# Patient Record
Sex: MALE | Race: WHITE | NOT HISPANIC OR LATINO | Employment: FULL TIME | URBAN - METROPOLITAN AREA
[De-identification: names, ages, dates, MRNs, and addresses within clinical notes are randomized per-mention and may not be internally consistent; named-entity substitution may affect disease eponyms.]

---

## 2024-09-19 ENCOUNTER — HOSPITAL ENCOUNTER (EMERGENCY)
Facility: HOSPITAL | Age: 59
Discharge: HOME | End: 2024-09-19
Attending: EMERGENCY MEDICINE | Admitting: EMERGENCY MEDICINE
Payer: OTHER MISCELLANEOUS

## 2024-09-19 ENCOUNTER — APPOINTMENT (OUTPATIENT)
Dept: RADIOLOGY | Facility: HOSPITAL | Age: 59
End: 2024-09-19
Attending: EMERGENCY MEDICINE
Payer: OTHER MISCELLANEOUS

## 2024-09-19 VITALS
HEART RATE: 62 BPM | OXYGEN SATURATION: 96 % | SYSTOLIC BLOOD PRESSURE: 141 MMHG | TEMPERATURE: 98.3 F | HEIGHT: 71 IN | WEIGHT: 245 LBS | BODY MASS INDEX: 34.3 KG/M2 | DIASTOLIC BLOOD PRESSURE: 89 MMHG | RESPIRATION RATE: 12 BRPM

## 2024-09-19 DIAGNOSIS — S61.211A LACERATION OF LEFT INDEX FINGER WITHOUT DAMAGE TO NAIL, FOREIGN BODY PRESENCE UNSPECIFIED, INITIAL ENCOUNTER: ICD-10-CM

## 2024-09-19 PROBLEM — F17.220 CHEWING TOBACCO DEPENDENCE: Status: ACTIVE | Noted: 2018-08-27

## 2024-09-19 PROBLEM — H34.219 CHOLESTEROL RETINAL EMBOLUS: Status: ACTIVE | Noted: 2024-08-09

## 2024-09-19 PROBLEM — G47.33 OBSTRUCTIVE SLEEP APNEA SYNDROME: Status: ACTIVE | Noted: 2018-08-27

## 2024-09-19 PROCEDURE — 90715 TDAP VACCINE 7 YRS/> IM: CPT | Performed by: EMERGENCY MEDICINE

## 2024-09-19 PROCEDURE — 272N000004 HC RX 272: Performed by: EMERGENCY MEDICINE

## 2024-09-19 PROCEDURE — 90471 IMMUNIZATION ADMIN: CPT | Performed by: EMERGENCY MEDICINE

## 2024-09-19 PROCEDURE — 12001 RPR S/N/AX/GEN/TRNK 2.5CM/<: CPT

## 2024-09-19 PROCEDURE — 250N000011 HC RX IP 250 OP 636: Performed by: EMERGENCY MEDICINE

## 2024-09-19 PROCEDURE — 99284 EMERGENCY DEPT VISIT MOD MDM: CPT | Mod: 25

## 2024-09-19 PROCEDURE — 73140 X-RAY EXAM OF FINGER(S): CPT | Mod: LT

## 2024-09-19 RX ORDER — CEPHALEXIN 500 MG/1
500 CAPSULE ORAL 4 TIMES DAILY
Qty: 28 CAPSULE | Refills: 0 | Status: SHIPPED | OUTPATIENT
Start: 2024-09-19 | End: 2024-09-26

## 2024-09-19 RX ADMIN — Medication 1 EACH: at 10:07

## 2024-09-19 RX ADMIN — CLOSTRIDIUM TETANI TOXOID ANTIGEN (FORMALDEHYDE INACTIVATED), CORYNEBACTERIUM DIPHTHERIAE TOXOID ANTIGEN (FORMALDEHYDE INACTIVATED), BORDETELLA PERTUSSIS TOXOID ANTIGEN (GLUTARALDEHYDE INACTIVATED), BORDETELLA PERTUSSIS FILAMENTOUS HEMAGGLUTININ ANTIGEN (FORMALDEHYDE INACTIVATED), BORDETELLA PERTUSSIS PERTACTIN ANTIGEN, AND BORDETELLA PERTUSSIS FIMBRIAE 2/3 ANTIGEN 0.5 ML: 5; 2; 2.5; 5; 3; 5 INJECTION, SUSPENSION INTRAMUSCULAR at 10:08

## 2024-09-19 ASSESSMENT — COLUMBIA-SUICIDE SEVERITY RATING SCALE - C-SSRS
6. HAVE YOU EVER DONE ANYTHING, STARTED TO DO ANYTHING, OR PREPARED TO DO ANYTHING TO END YOUR LIFE?: NO
1. IN THE PAST MONTH, HAVE YOU WISHED YOU WERE DEAD OR WISHED YOU COULD GO TO SLEEP AND NOT WAKE UP?: NO
2. HAVE YOU ACTUALLY HAD ANY THOUGHTS OF KILLING YOURSELF IN THE PAST MONTH?: NO

## 2024-09-19 ASSESSMENT — ACTIVITIES OF DAILY LIVING (ADL)
ADLS_ACUITY_SCORE: 35

## 2024-09-19 NOTE — ED PROVIDER NOTES
EMERGENCY DEPARTMENT ENCOUNTER      NAME: Mega Matthews  AGE: 58 year old male  YOB: 1965  MRN: [id number]  EVALUATION DATE & TIME: 2024  7:21 AM    PCP: No primary care provider on file.    ED PROVIDER: Tr Edwards D.O.      Chief Complaint   Patient presents with    Laceration       FINAL IMPRESSION:  1. Laceration of left index finger without damage to nail, foreign body presence unspecified, initial encounter        ED COURSE & MEDICAL DECISION MAKIN:35 AM I met with the patient to gather history and to perform my initial exam. I discussed the plan for care while in the Emergency Department.  10:09 AM I spoke on the phone with Dr. Latif, orthopedics, who recommends discharging the patient with Augmentin and Keflex, and advising for follow-up either with our hand surgeon or a hand surgeon closer to the patient's home.  10:20 AM I updated the patient and discussed the plan for discharge, patient is agreeable at this time.         Pertinent Labs & Imaging studies reviewed. (See chart for details)  58 year old male presents to the Emergency Department for evaluation of laceration to the pad of the index finger of the left hand after an injury at work.  The wound was quite dirty, tetanus was updated in the emergency department.  X-ray did not show any evidence of fracture, but there was some very tiny foreign bodies, however we could not locate these on the exam.  We did thoroughly irrigate the wound, and was able to somewhat close the wound with 8 simple interrupted sutures.  However some the tissue was somewhat friable therefore we are unable to fully close it.  I consulted with hand surgery, and they were okay with the wound remaining as it is, and recommended antibiotics at discharge and follow-up in clinic.  Patient was comfortable with this plan.  Return precautions were discussed.    Medical Decision Making  Obtained supplemental history:Supplemental history obtained?:  Documented in chart and Friend  Reviewed external records: External records reviewed?: Documented in chart  Care impacted by chronic illness:Smoking / Nicotine Use  Care significantly affected by social determinants of health:N/A  Did you consider but not order tests?: Work up considered but not performed and documented in chart, if applicable  Did you interpret images independently?: Independent interpretation of ECG and images noted in documentation, when applicable.  Consultation discussion with other provider:Did you involve another provider (consultant, , pharmacy, etc.)?: No  Discharge. I prescribed additional prescription strength medication(s) as charted. See documentation for any additional details.  Not Applicable      At the conclusion of the encounter I discussed the results of all of the tests and the disposition. The questions were answered. The patient or family acknowledged understanding and was agreeable with the care plan.          HPI    Patient information was obtained from: Patient and friend    Use of : N/A        Mega Matthews is a 58 year old male who presents for evaluation of finger laceration.    The patient reports that last night around 8:30 PM, his wrench slipped while he was bolting a water main at work, causing him to cut his left pointer finger. He wrapped it before bed and after removing the bandage this morning, decided to seek evaluation as his wound was worse than he initially thought. Patient believes his tetanus is up-to-date, and notes he was just seen by his PCP 2 weeks ago.    Per Chart Review: Patient's last Tdap was uncertain        PAST MEDICAL HISTORY:  No past medical history on file.    PAST SURGICAL HISTORY:  No past surgical history on file.      CURRENT MEDICATIONS:    No current facility-administered medications for this encounter.     Current Outpatient Medications   Medication Sig Dispense Refill    amoxicillin-clavulanate (AUGMENTIN) 875-125 MG  "tablet Take 1 tablet by mouth 2 times daily for 7 days. 14 tablet 0    cephALEXin (KEFLEX) 500 MG capsule Take 1 capsule (500 mg) by mouth 4 times daily for 7 days. 28 capsule 0         ALLERGIES:  No Known Allergies    FAMILY HISTORY:  No family history on file.    SOCIAL HISTORY:       VITALS:  Patient Vitals for the past 24 hrs:   BP Temp Temp src Pulse Resp SpO2 Height Weight   09/19/24 0830 (!) 140/92 -- -- 66 -- 97 % -- --   09/19/24 0718 (!) 158/97 98.3  F (36.8  C) Tympanic 76 12 97 % 1.803 m (5' 11\") 111.1 kg (245 lb)       PHYSICAL EXAM    VITAL SIGNS: BP (!) 140/92   Pulse 66   Temp 98.3  F (36.8  C) (Tympanic)   Resp 12   Ht 1.803 m (5' 11\")   Wt 111.1 kg (245 lb)   SpO2 97%   BMI 34.17 kg/m      General Appearance: Well-appearing, well-nourished, no acute distress   Head:  Normocephalic, without obvious abnormality, atraumatic  Eyes:  PERRL, conjunctiva/corneas clear, EOM's intact,  ENT:  Lips, mucosa, and tongue normal, membranes are moist without pallor  Neck:  Normal ROM, symmetrical, trachea midline    Musculoskeletal: Full ROM, no edema, no cyanosis, good ROM of major joints  Integument:  Warm, Dry, No erythema, No rash.  3 cm U-shaped laceration to the pad of the index finger of the left hand  Neurologic:  Alert & oriented.  No focal deficits appreciated.  Ambulatory.  Psychiatric:  Affect normal, Judgment normal, Mood normal.      LABS  Results for orders placed or performed during the hospital encounter of 09/19/24 (from the past 24 hour(s))   Fingers XR, 2-3 views, left    Narrative    EXAM: XR FINGER LEFT G/E 2 VIEWS  LOCATION: [facility]  DATE: 9/19/2024    INDICATION: Trauma, laceration  COMPARISON: None.      Impression    IMPRESSION:     No acute left index finger fracture is evident. There is laceration involving the index finger volar soft tissues at the level of the distal phalanx and DIP joint with multiple tiny radiodensities projecting over the volar " soft tissues in this region   suspicious for a very small foreign bodies. 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 Normal index finger joint spacing.           RADIOLOGY  Fingers XR, 2-3 views, left   Final Result   IMPRESSION:       No acute left index finger fracture is evident. There is laceration involving the index finger volar soft tissues at the level of the distal phalanx and DIP joint with multiple tiny radiodensities projecting over the volar soft tissues in this region    suspicious for a very small foreign bodies. 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 Normal index finger joint spacing.              I have independently interpreted the above image, no obvious fracture of the index finger of the left hand. See radiology report for detail.      PROCEDURES:  PROCEDURE: Laceration Repair   INDICATIONS: Laceration   PROCEDURE PROVIDER: Dr Tr Edwards   SITE: Pad of the index finger of the left hand   TYPE/SIZE: complex, clean, and no foreign body visualized  53 cm (total length)   FUNCTIONAL ASSESSMENT: Distal sensation, circulation, and motor intact   MEDICATION: 5 mLs of 1% Lidocaine without epinephrine   PREPARATION: soaking and irrigation with Normal saline and Hibiclens   DEBRIDEMENT: wound explored, no foreign body found and minimal debridement   CLOSURE:  Superficial layer closed with 8 stitches of 4-0 Prolene simple interrupted    Total number of sutures/staples placed: 8              MEDICATIONS GIVEN IN THE EMERGENCY:  Medications   Tdap (tetanus-diphtheria-acell pertussis) (ADACEL) injection 0.5 mL (0.5 mLs Intramuscular $Given 9/19/24 1008)   oxidized cellulose (SURGICEL) pad 1 each (1 each Topical $Given 9/19/24 1007)       NEW PRESCRIPTIONS STARTED AT TODAY'S ER VISIT  New Prescriptions    AMOXICILLIN-CLAVULANATE (AUGMENTIN) 875-125 MG TABLET    Take 1 tablet by mouth 2 times daily for 7 days.    CEPHALEXIN (KEFLEX) 500 MG CAPSULE    Take 1 capsule (500 mg) by mouth 4 times daily  for 7 days.        I, Ayla Oneill, am serving as a scribe to document services personally performed by Tr Edwards D.O., based on my observations and the provider's statements to me.  I, Tr Edwards D.O., attest that Ayla Oneill is acting in a scribe capacity, has observed my performance of the services and has documented them in accordance with my direction.     Tr Edwards D.O.  Emergency Medicine  [facility] EMERGENCY DEPARTMENT  [address] [place] [id number] [phone]  Dept: [phone]      Tr Edwards,   09/19/24 9890

## 2024-09-19 NOTE — ED TRIAGE NOTES
Pt cut his finger at work  last night  at 0830pm when he was trying to bolt a watermain and the wrench slipped.  Has a  1 inch long diagonal laceration around the left index finger.  Bleeding  has stopped.  Pt thinks he's UTD on tetanus shot.